# Patient Record
Sex: MALE | Race: BLACK OR AFRICAN AMERICAN | Employment: FULL TIME | URBAN - METROPOLITAN AREA
[De-identification: names, ages, dates, MRNs, and addresses within clinical notes are randomized per-mention and may not be internally consistent; named-entity substitution may affect disease eponyms.]

---

## 2018-07-24 ENCOUNTER — OFFICE VISIT (OUTPATIENT)
Dept: ORTHOPEDIC SURGERY | Age: 28
End: 2018-07-24

## 2018-07-24 VITALS
BODY MASS INDEX: 39.17 KG/M2 | DIASTOLIC BLOOD PRESSURE: 81 MMHG | WEIGHT: 315 LBS | SYSTOLIC BLOOD PRESSURE: 131 MMHG | HEIGHT: 75 IN

## 2018-07-24 PROCEDURE — L4387 NON-PNEUM WALK BOOT PRE OTS: HCPCS | Performed by: INTERNAL MEDICINE

## 2018-07-24 PROCEDURE — 99203 OFFICE O/P NEW LOW 30 MIN: CPT | Performed by: INTERNAL MEDICINE

## 2018-07-24 NOTE — PROGRESS NOTES
test negative      Skin: There are no rashes, ulcerations or lesions. Gait: Antalgic      Reflex intact lower     Additional Comments:        Additional Examinations:           Left Lower Extremity: Examination of the left lower extremity does not show any tenderness, deformity or injury. Range of motion is unremarkable. There is no gross instability. There are no rashes, ulcerations or lesions. Strength and tone are normal.     Neurolgic -Light touch sensation and manual muscle testing normal L2-S1. No fasiculations. Pattella tendon and Achilles tendon reflexes +2 bilaterally. Seated SLR negative        Office Imaging Results/Procedures PerformedToday:             Office Procedures:     Orders Placed This Encounter   Procedures    Breg J-Walker Boot     Patient was prescribed a Breg J-Walker [employer]. The right  will require stabilization / immobilization from this semi-rigid / rigid orthosis to improve their function. The orthosis will assist in protecting the affected area, provide functional support and facilitate healing. Patient was instructed to progress ambulation weight bearing as tolerated in the device. The patient was educated and fit by a healthcare professional with expert knowledge and specialization in brace application while under the direct supervision of the physician. Verbal and written instructions for the use of and application of this item were provided. They were instructed to contact the office immediately should the brace result in increased pain, decreased sensation, increased swelling or worsening of the condition. EXAMINATION:   2 XRAY VIEWS OF THE RIGHT ANKLE       7/23/2018 7:44 am       COMPARISON:   None.       HISTORY:   ORDERING PHYSICIAN PROVIDED HISTORY: pain mostly medial mall   TECHNOLOGIST PROVIDED HISTORY:   Technologist Provided Reason for Exam: pt c/o right ankle injury.  pt states   he hurt his ankle about 3-4weeks ago, tried to rest it but pain note was dictated with voice recognition software. Though review and correction are routine, we apologize for any errors. \"

## 2018-07-24 NOTE — LETTER
[facility]  [address] [address]  Phone: [phone]  Fax: [phone]    Lupe Webster MD        July 24, 2018     Patient: Chio Riggs   YOB: 1990   Date of Visit: 7/24/2018       To Whom It May Concern: It is my medical opinion that Sabino Pacheco may return to light duty immediately with the following restrictions: :   Brace boot immobilization with all walking activity and use of crutches as needed for symptom control  No operating of foot pedals or control devices with right lower extremity  These restrictions will be in effect for the next 3 weeks    If you have any questions or concerns, please don't hesitate to call.     Sincerely,    MD Lupe Little MD

## 2018-07-25 ENCOUNTER — TELEPHONE (OUTPATIENT)
Dept: ORTHOPEDIC SURGERY | Age: 28
End: 2018-07-25

## 2018-08-08 ENCOUNTER — OFFICE VISIT (OUTPATIENT)
Dept: ORTHOPEDIC SURGERY | Age: 28
End: 2018-08-08

## 2018-08-08 PROCEDURE — L1902 AFO ANKLE GAUNTLET PRE OTS: HCPCS | Performed by: INTERNAL MEDICINE

## 2018-08-08 PROCEDURE — 99213 OFFICE O/P EST LOW 20 MIN: CPT | Performed by: INTERNAL MEDICINE

## 2018-08-08 NOTE — PROGRESS NOTES
Chief Complaint:   Chief Complaint   Patient presents with    Ankle Pain     f/u R ankle sprain          History of Present Illness:       Patient is a 25 y.o. male returns follow up for the above complaint. The patient was last seen approximately 2 weeksago. The symptoms are improving since the last visit. The patient has had no further testing for the problem. He was unable to return to work with restrictions    Overall increasing more functional and having less pain no progress with weightbearing in the office majority of his pain localizes to the medial aspect of the ankle    No mechanical symptoms or subjective instability     Past Medical History:        Past Medical History:   Diagnosis Date    Second degree atrioventricular block         Present Medications:         Current Outpatient Prescriptions   Medication Sig Dispense Refill    naproxen (NAPROSYN) 500 MG tablet Take 1 tablet by mouth 2 times daily as needed for Pain 60 tablet 0     No current facility-administered medications for this visit. Allergies:      No Known Allergies        Review of Systems:    Pertinent items are noted in HPI    . Vital Signs: There were no vitals filed for this visit. General Exam:     Constitutional: Patient is adequately groomed with no evidence of malnutrition    Physical Exam: right ankle      Primary Exam:    Inspection:  Mild asymmetric soft tissue swelling      Palpation:  Minimal over the lateral ligamentous complex      Range of Motion:  Globally increased with minimal discomfort      Strength:  Near normal low-grade discomfort      Special Tests:  Anterior drawer positive talar tilt stable, single heel rise mild weakness and low-grade discomfort      Skin: There are no rashes, ulcerations or lesions.       Gait: Mildly antalgic      Neurovascular - non focal and intact       Additional Comments:        Additional Examinations:                   Office Imaging Results/Procedures

## 2018-08-08 NOTE — LETTER
[facility]  [address] [address]  Phone: [phone]  Fax: [phone]    Jerrica Beach MD        August 8, 2018     Patient: Abi Burton   YOB: 1990   Date of Visit: 8/8/2018       To Whom It May Concern: It is my medical opinion that Adebayo Sharp should remain out of work until 8/23/18. If you have any questions or concerns, please don't hesitate to call.     Sincerely,      Ramona Hernandez MD.    Jerrica Beach MD

## 2018-08-09 ENCOUNTER — TELEPHONE (OUTPATIENT)
Dept: ORTHOPEDIC SURGERY | Age: 28
End: 2018-08-09

## 2018-08-09 NOTE — TELEPHONE ENCOUNTER
8/8/18  DME   - NO COVERAGE FOUND - SELF  PAY PER [id number] FACESHEET DATED 7/23/18 -  NDS

## 2018-08-22 ENCOUNTER — OFFICE VISIT (OUTPATIENT)
Dept: ORTHOPEDIC SURGERY | Age: 28
End: 2018-08-22

## 2018-08-22 VITALS — WEIGHT: 315 LBS | HEIGHT: 75 IN | BODY MASS INDEX: 39.17 KG/M2

## 2018-08-22 PROCEDURE — 99213 OFFICE O/P EST LOW 20 MIN: CPT | Performed by: INTERNAL MEDICINE

## 2018-08-22 NOTE — PROGRESS NOTES
stable single heel rise with good strength      Skin: There are no rashes, ulcerations or lesions. Gait: nonantalgic    Neurovascular - non focal and intact       Additional Comments:        Additional Examinations:                  Office Imaging Results/Procedures PerformedToday:           Office Procedures:   No orders of the defined types were placed in this encounter. Other Outside Imaging and Testing Personally Reviewed:       none          Assessment   Impression: . Encounter Diagnosis   Name Primary?  Grade 2 ankle sprain, right, initial encounter Yes              Plan:       Allow him to return to full duty as of Monday, August 27, 2018  Continue functional ankle bracing for the next 3 weeks outside the home with recreational and work activities and with sports participation thereafter for at least another month  We will provide him with home exercises along with resistive bands as he has monetary constraints with respect to attending physical therapy  Limited ultrasound evaluation of the ankle/medial ankle and/or MRI when necessary if his symptoms of medial ankle pain persist show no improvement or worsen    Medial ankle pain may well relate to bone contusion related to the lateral ankle sprain. Orders:      No orders of the defined types were placed in this encounter. Kirk Spencer MD.      Disclaimer: \"This note was dictated with voice recognition software. Though review and correction are routine, we apologize for any errors. \"

## 2018-09-01 ENCOUNTER — HOSPITAL ENCOUNTER (OUTPATIENT)
Dept: PHYSICAL THERAPY | Age: 28
Discharge: HOME | End: 2018-09-01
Attending: INTERNAL MEDICINE | Admitting: INTERNAL MEDICINE

## 2019-03-12 ENCOUNTER — APPOINTMENT (OUTPATIENT)
Dept: GENERAL RADIOLOGY | Age: 29
End: 2019-03-12

## 2019-03-12 ENCOUNTER — HOSPITAL ENCOUNTER (OUTPATIENT)
Age: 29
Setting detail: OBSERVATION
Discharge: HOME | End: 2019-03-12
Attending: EMERGENCY MEDICINE | Admitting: INTERNAL MEDICINE

## 2019-03-12 ENCOUNTER — APPOINTMENT (OUTPATIENT)
Dept: CT IMAGING | Age: 29
End: 2019-03-12

## 2019-03-12 VITALS
SYSTOLIC BLOOD PRESSURE: 134 MMHG | WEIGHT: 315 LBS | TEMPERATURE: 98.4 F | HEART RATE: 68 BPM | OXYGEN SATURATION: 94 % | BODY MASS INDEX: 40.87 KG/M2 | RESPIRATION RATE: 19 BRPM | DIASTOLIC BLOOD PRESSURE: 89 MMHG

## 2019-03-12 DIAGNOSIS — R00.1 BRADYCARDIA: ICD-10-CM

## 2019-03-12 DIAGNOSIS — I44.1 AV BLOCK, MOBITZ 1: ICD-10-CM

## 2019-03-12 DIAGNOSIS — R00.1 BRADYCARDIA: Primary | ICD-10-CM

## 2019-03-12 DIAGNOSIS — R07.9 CHEST PAIN, UNSPECIFIED TYPE: Primary | ICD-10-CM

## 2019-03-12 PROBLEM — I49.9 ARRHYTHMIA: Status: ACTIVE | Noted: 2019-03-12

## 2019-03-12 PROBLEM — E66.01 MORBID OBESITY WITH BMI OF 40.0-44.9, ADULT (HCC): Status: ACTIVE | Noted: 2019-03-12

## 2019-03-12 PROBLEM — F41.9 ANXIETY: Status: ACTIVE | Noted: 2019-03-12

## 2019-03-12 PROBLEM — F12.10 CANNABIS ABUSE: Status: ACTIVE | Noted: 2019-03-12

## 2019-03-12 LAB
ANION GAP SERPL CALCULATED.3IONS-SCNC: 10 MMOL/L (ref 3–16)
BASOPHILS ABSOLUTE: 0 K/UL (ref 0–0.2)
BASOPHILS RELATIVE PERCENT: 1 %
BUN BLDV-MCNC: 11 MG/DL (ref 7–20)
CALCIUM SERPL-MCNC: 8.5 MG/DL (ref 8.3–10.6)
CHLORIDE BLD-SCNC: 108 MMOL/L (ref 99–110)
CO2: 23 MMOL/L (ref 21–32)
CREAT SERPL-MCNC: 1 MG/DL (ref 0.9–1.3)
EKG ATRIAL RATE: 63 BPM
EKG DIAGNOSIS: NORMAL
EKG P AXIS: 49 DEGREES
EKG Q-T INTERVAL: 402 MS
EKG QRS DURATION: 86 MS
EKG QTC CALCULATION (BAZETT): 377 MS
EKG R AXIS: 39 DEGREES
EKG T AXIS: 29 DEGREES
EKG VENTRICULAR RATE: 53 BPM
EOSINOPHILS ABSOLUTE: 0.1 K/UL (ref 0–0.6)
EOSINOPHILS RELATIVE PERCENT: 1.2 %
GFR AFRICAN AMERICAN: >60
GFR NON-AFRICAN AMERICAN: >60
GLUCOSE BLD-MCNC: 108 MG/DL (ref 70–99)
HCT VFR BLD CALC: 42 % (ref 40.5–52.5)
HEMOGLOBIN: 13.9 G/DL (ref 13.5–17.5)
LEFT VENTRICULAR EJECTION FRACTION HIGH VALUE: 60 %
LEFT VENTRICULAR EJECTION FRACTION MODE: NORMAL
LV EF: 50 %
LV EF: 55 %
LVEF MODALITY: NORMAL
LYMPHOCYTES ABSOLUTE: 2.3 K/UL (ref 1–5.1)
LYMPHOCYTES RELATIVE PERCENT: 48.9 %
MCH RBC QN AUTO: 32.5 PG (ref 26–34)
MCHC RBC AUTO-ENTMCNC: 33.2 G/DL (ref 31–36)
MCV RBC AUTO: 97.9 FL (ref 80–100)
MONOCYTES ABSOLUTE: 0.4 K/UL (ref 0–1.3)
MONOCYTES RELATIVE PERCENT: 8 %
NEUTROPHILS ABSOLUTE: 1.9 K/UL (ref 1.7–7.7)
NEUTROPHILS RELATIVE PERCENT: 40.9 %
PDW BLD-RTO: 12.9 % (ref 12.4–15.4)
PLATELET # BLD: 226 K/UL (ref 135–450)
PMV BLD AUTO: 7.1 FL (ref 5–10.5)
POTASSIUM REFLEX MAGNESIUM: 4.2 MMOL/L (ref 3.5–5.1)
PRO-BNP: 110 PG/ML (ref 0–124)
RBC # BLD: 4.29 M/UL (ref 4.2–5.9)
SODIUM BLD-SCNC: 141 MMOL/L (ref 136–145)
TROPONIN: <0.01 NG/ML
WBC # BLD: 4.7 K/UL (ref 4–11)

## 2019-03-12 PROCEDURE — 93320 DOPPLER ECHO COMPLETE: CPT

## 2019-03-12 PROCEDURE — G0378 HOSPITAL OBSERVATION PER HR: HCPCS

## 2019-03-12 PROCEDURE — 93010 ELECTROCARDIOGRAM REPORT: CPT | Performed by: INTERNAL MEDICINE

## 2019-03-12 PROCEDURE — 93005 ELECTROCARDIOGRAM TRACING: CPT | Performed by: EMERGENCY MEDICINE

## 2019-03-12 PROCEDURE — 83880 ASSAY OF NATRIURETIC PEPTIDE: CPT

## 2019-03-12 PROCEDURE — 84484 ASSAY OF TROPONIN QUANT: CPT

## 2019-03-12 PROCEDURE — 6360000002 HC RX W HCPCS: Performed by: INTERNAL MEDICINE

## 2019-03-12 PROCEDURE — 71046 X-RAY EXAM CHEST 2 VIEWS: CPT

## 2019-03-12 PROCEDURE — 6360000004 HC RX CONTRAST MEDICATION: Performed by: EMERGENCY MEDICINE

## 2019-03-12 PROCEDURE — 96374 THER/PROPH/DIAG INJ IV PUSH: CPT

## 2019-03-12 PROCEDURE — 99254 IP/OBS CNSLTJ NEW/EST MOD 60: CPT | Performed by: INTERNAL MEDICINE

## 2019-03-12 PROCEDURE — 99285 EMERGENCY DEPT VISIT HI MDM: CPT

## 2019-03-12 PROCEDURE — 85025 COMPLETE CBC W/AUTO DIFF WBC: CPT

## 2019-03-12 PROCEDURE — 80048 BASIC METABOLIC PNL TOTAL CA: CPT

## 2019-03-12 PROCEDURE — 93351 STRESS TTE COMPLETE: CPT

## 2019-03-12 PROCEDURE — 71260 CT THORAX DX C+: CPT

## 2019-03-12 RX ORDER — MORPHINE SULFATE 2 MG/ML
2 INJECTION, SOLUTION INTRAMUSCULAR; INTRAVENOUS EVERY 4 HOURS PRN
Status: DISCONTINUED | OUTPATIENT
Start: 2019-03-12 | End: 2019-03-12 | Stop reason: HOSPADM

## 2019-03-12 RX ORDER — PANTOPRAZOLE SODIUM 40 MG/10ML
40 INJECTION, POWDER, LYOPHILIZED, FOR SOLUTION INTRAVENOUS DAILY
Status: DISCONTINUED | OUTPATIENT
Start: 2019-03-12 | End: 2019-03-12 | Stop reason: HOSPADM

## 2019-03-12 RX ORDER — ONDANSETRON 2 MG/ML
4 INJECTION INTRAMUSCULAR; INTRAVENOUS EVERY 6 HOURS PRN
Status: DISCONTINUED | OUTPATIENT
Start: 2019-03-12 | End: 2019-03-12 | Stop reason: HOSPADM

## 2019-03-12 RX ORDER — SODIUM CHLORIDE 0.9 % (FLUSH) 0.9 %
10 SYRINGE (ML) INJECTION EVERY 12 HOURS SCHEDULED
Status: DISCONTINUED | OUTPATIENT
Start: 2019-03-12 | End: 2019-03-12 | Stop reason: HOSPADM

## 2019-03-12 RX ORDER — KETOROLAC TROMETHAMINE 30 MG/ML
30 INJECTION, SOLUTION INTRAMUSCULAR; INTRAVENOUS EVERY 6 HOURS
Status: DISCONTINUED | OUTPATIENT
Start: 2019-03-12 | End: 2019-03-12 | Stop reason: HOSPADM

## 2019-03-12 RX ORDER — ASPIRIN 81 MG/1
324 TABLET, CHEWABLE ORAL ONCE
Status: DISCONTINUED | OUTPATIENT
Start: 2019-03-12 | End: 2019-03-12 | Stop reason: HOSPADM

## 2019-03-12 RX ORDER — SODIUM CHLORIDE 9 MG/ML
INJECTION, SOLUTION INTRAVENOUS CONTINUOUS
Status: DISCONTINUED | OUTPATIENT
Start: 2019-03-12 | End: 2019-03-12 | Stop reason: HOSPADM

## 2019-03-12 RX ORDER — OMEPRAZOLE 20 MG/1
20 CAPSULE, DELAYED RELEASE ORAL
Qty: 30 CAPSULE | Refills: 0 | Status: SHIPPED | OUTPATIENT
Start: 2019-03-12

## 2019-03-12 RX ORDER — SODIUM CHLORIDE 0.9 % (FLUSH) 0.9 %
10 SYRINGE (ML) INJECTION PRN
Status: DISCONTINUED | OUTPATIENT
Start: 2019-03-12 | End: 2019-03-12 | Stop reason: HOSPADM

## 2019-03-12 RX ORDER — ACETAMINOPHEN 325 MG/1
650 TABLET ORAL EVERY 4 HOURS PRN
Status: DISCONTINUED | OUTPATIENT
Start: 2019-03-12 | End: 2019-03-12 | Stop reason: HOSPADM

## 2019-03-12 RX ORDER — LORAZEPAM 2 MG/ML
0.5 INJECTION INTRAMUSCULAR EVERY 6 HOURS PRN
Status: DISCONTINUED | OUTPATIENT
Start: 2019-03-12 | End: 2019-03-12 | Stop reason: HOSPADM

## 2019-03-12 RX ADMIN — KETOROLAC TROMETHAMINE 30 MG: 30 INJECTION, SOLUTION INTRAMUSCULAR at 17:02

## 2019-03-12 RX ADMIN — IOPAMIDOL 75 ML: 755 INJECTION, SOLUTION INTRAVENOUS at 11:12

## 2019-03-12 RX ADMIN — KETOROLAC TROMETHAMINE 30 MG: 30 INJECTION, SOLUTION INTRAMUSCULAR at 13:07

## 2019-03-12 ASSESSMENT — ENCOUNTER SYMPTOMS
VOMITING: 0
ABDOMINAL PAIN: 0
DIARRHEA: 0
CONSTIPATION: 0
RHINORRHEA: 0
SHORTNESS OF BREATH: 0
SORE THROAT: 0
NAUSEA: 0
BLOOD IN STOOL: 0

## 2019-03-12 ASSESSMENT — PAIN DESCRIPTION - PAIN TYPE: TYPE: ACUTE PAIN

## 2019-03-12 ASSESSMENT — PAIN SCALES - GENERAL
PAINLEVEL_OUTOF10: 3
PAINLEVEL_OUTOF10: 6
PAINLEVEL_OUTOF10: 3

## 2019-03-12 ASSESSMENT — PAIN DESCRIPTION - LOCATION: LOCATION: CHEST

## 2019-04-01 NOTE — PROGRESS NOTES
[address]   Electrophysiology Follow Up   Date: 4/2/2019     CC:2nd degree AV block  HPI: Francisco Mary is a 30 y.o. PMH of morbid obesity, and type 1 second degree AV block for years. Patient has long history of Prolonged TX in a young patient with episodes of wenckebach, Mobitz type 1 in Holter monitoring. He was seen many years ago. GXT showed improvement of AV conduction with exercise and he achieved target heart rates. He was seen recently in the ED by Robert Peña when he presented with atypical chest pain. Stress echo was normal and able to increase his heart rate to 170 during exercise with no exertional AV block. He exercises without difficulty and remains active playing basketball. His job is sedentary. No syncope. Past Medical History:   Diagnosis Date    Second degree atrioventricular block         Past Surgical History:   Procedure Laterality Date    HERNIA REPAIR         Allergies   Allergen Reactions    Aspirin        Social History:   reports that he has quit smoking. His smoking use included cigars. He smoked 1.00 pack per day. He has never used smokeless tobacco. He reports that he drinks alcohol. He reports that he has current or past drug history. Drug: Marijuana. Family History:  family history includes Arrhythmia in his brother. Review of System:  All other systems reviewed except for that noted above.  Pertinent negatives and positives are:      General: negative for fever, chills   Ophthalmic ROS: negative for - eye pain or loss of vision  ENT ROS: negative for - headaches, sore throat   Respiratory: negative for - cough, sputum  Cardiovascular: Reviewed in HPI  Gastrointestinal: negative for - abdominal pain, diarrhea, N/V  Hematology: negative for - bleeding, blood clots, bruising or jaundice  Genito-Urinary:  negative for - Dysuria or incontinence  Musculoskeletal: negative for - Joint swelling, muscle pain  Neurological: negative for - confusion, dizziness, headaches   Psychiatric: No anxiety, no depression. Dermatological: negative for - rash    Physical Examination:  Vitals:    19 1149   BP: 101/71   Pulse: 86   Resp: 14      Wt Readings from Last 3 Encounters:   19 (!) 326 lb (147.9 kg)   19 (!) 327 lb (148.3 kg)   18 (!) 330 lb (149.7 kg)       · Constitutional: Oriented. No distress. · Head: Normocephalic and atraumatic. · Mouth/Throat: Oropharynx is clear and moist.   · Eyes: Conjunctivae normal. EOM are normal.   · Neck: Neck supple. No rigidity. No JVD present. · Cardiovascular: Normal rate, regular rhythm, S1&S2. · Pulmonary/Chest: Bilateral respiratory sounds. No wheezes, No rhonchi. · Abdominal: Soft. Bowel sounds present. No distension, No tenderness. · Musculoskeletal: No tenderness. No edema    · Lymphadenopathy: Has no cervical adenopathy. · Neurological: Alert and oriented. Cranial nerve appears intact, No Gross deficit   · Skin: Skin is warm and dry. No rash noted. · Psychiatric: Has a normal behavior       Labs, diagnostic and imaging results reviewed. Reviewed. Lab Results   Component Value Date    CREATININE 1.0 2019    CREATININE 1.0 2015    AST 29 2015    ALT 31 2015       EC19   Sinus Roberto with 2nd degree av block type I   QTc 391      Stress Echo: 3/12/19   Summary   Normal stress echocardiogram study.     Medication:  Current Outpatient Medications   Medication Sig Dispense Refill    omeprazole (PRILOSEC) 20 MG delayed release capsule Take 1 capsule by mouth every morning (before breakfast) 30 capsule 0     No current facility-administered medications for this visit. Assessment and plan:     2nd Degree Av block type I    - Patient has long history of second-degree AV block type I. He is asymptomatic with it. He has had GXT in the past with improvement of AV block which is reassuring and had no high grade AV block with activity.      Holter monitoring reviewed and showed second-degree AV block type I, asymptomatic. No treatment is needed. - Atypical chest pain:    Chest pressures was atypical.  It has been resolved. Stress echo normal with no evidence of ischemia. Patient has history of costochondritis in the past.  Other causes of chest pain should be evaluated. He is morbidly obese and at risk of having obstructive sleep apnea. Recommend sleep study. - ? SAM: sleep study referral.     - Morbid obesity: Body mass index is 40.75 kg/m². Weight loss recommended. Thank you for allowing me to participate in the care of Pradeep Davalos. Further evaluation will be based upon the patient's clinical course and testing results. All questions and concerns were addressed to the patient/family. Alternatives to my treatment were discussed. I have discussed the above stated plan and the patient verbalized understanding and agreed with the plan. NOTE: This report was transcribed using voice recognition software. Every effort was made to ensure accuracy, however, inadvertent computerized transcription errors may be present. Francisco Ribeiro MD, MPH  [facility], [place]   Office: [phone]     Scribe attestation: This note was scribed in the presence of Francisco Ribeiro MD by Roberto Owusu RN  Physician Attestation: I, Dr. Francisco Ribeiro, confirm that the scribe's documentation has been prepared under my direction and personally reviewed by me in its entirety. I also confirm that the note above accurately reflects all work, treatment, procedures, and medical decision making performed by me.

## 2019-04-02 ENCOUNTER — OFFICE VISIT (OUTPATIENT)
Dept: CARDIOLOGY CLINIC | Age: 29
End: 2019-04-02

## 2019-04-02 VITALS
HEART RATE: 86 BPM | HEIGHT: 75 IN | RESPIRATION RATE: 14 BRPM | WEIGHT: 315 LBS | DIASTOLIC BLOOD PRESSURE: 71 MMHG | BODY MASS INDEX: 39.17 KG/M2 | SYSTOLIC BLOOD PRESSURE: 101 MMHG

## 2019-04-02 DIAGNOSIS — I44.1 AV BLOCK, 2ND DEGREE: ICD-10-CM

## 2019-04-02 DIAGNOSIS — R00.1 BRADYCARDIA: Primary | ICD-10-CM

## 2019-04-02 PROCEDURE — 99214 OFFICE O/P EST MOD 30 MIN: CPT | Performed by: INTERNAL MEDICINE

## 2019-04-02 PROCEDURE — 93000 ELECTROCARDIOGRAM COMPLETE: CPT | Performed by: INTERNAL MEDICINE

## 2019-04-15 ENCOUNTER — OFFICE VISIT (OUTPATIENT)
Dept: PULMONOLOGY | Age: 29
End: 2019-04-15

## 2019-04-15 VITALS
DIASTOLIC BLOOD PRESSURE: 80 MMHG | SYSTOLIC BLOOD PRESSURE: 137 MMHG | HEART RATE: 62 BPM | OXYGEN SATURATION: 98 % | HEIGHT: 75 IN | WEIGHT: 315 LBS | BODY MASS INDEX: 39.17 KG/M2

## 2019-04-15 DIAGNOSIS — K21.9 GERD WITHOUT ESOPHAGITIS: Chronic | ICD-10-CM

## 2019-04-15 DIAGNOSIS — E66.01 MORBID OBESITY, UNSPECIFIED OBESITY TYPE (HCC): Chronic | ICD-10-CM

## 2019-04-15 DIAGNOSIS — R06.83 SNORING: ICD-10-CM

## 2019-04-15 DIAGNOSIS — G47.10 HYPERSOMNIA: Primary | ICD-10-CM

## 2019-04-15 DIAGNOSIS — R00.1 BRADYCARDIA: Chronic | ICD-10-CM

## 2019-04-15 PROCEDURE — 99203 OFFICE O/P NEW LOW 30 MIN: CPT | Performed by: INTERNAL MEDICINE

## 2019-04-15 ASSESSMENT — ENCOUNTER SYMPTOMS
APNEA: 1
RHINORRHEA: 0
CHEST TIGHTNESS: 0
NAUSEA: 0
ALLERGIC/IMMUNOLOGIC NEGATIVE: 1
SHORTNESS OF BREATH: 1
CHOKING: 0
PHOTOPHOBIA: 0
ABDOMINAL PAIN: 0
VOMITING: 0
EYE PAIN: 0
ABDOMINAL DISTENTION: 0

## 2019-04-15 ASSESSMENT — SLEEP AND FATIGUE QUESTIONNAIRES
HOW LIKELY ARE YOU TO NOD OFF OR FALL ASLEEP WHILE LYING DOWN TO REST IN THE AFTERNOON WHEN CIRCUMSTANCES PERMIT: 1
HOW LIKELY ARE YOU TO NOD OFF OR FALL ASLEEP WHEN YOU ARE A PASSENGER IN A CAR FOR AN HOUR WITHOUT A BREAK: 3
ESS TOTAL SCORE: 11
HOW LIKELY ARE YOU TO NOD OFF OR FALL ASLEEP WHILE SITTING INACTIVE IN A PUBLIC PLACE: 1
HOW LIKELY ARE YOU TO NOD OFF OR FALL ASLEEP WHILE SITTING QUIETLY AFTER LUNCH WITHOUT ALCOHOL: 3
HOW LIKELY ARE YOU TO NOD OFF OR FALL ASLEEP WHILE WATCHING TV: 2
NECK CIRCUMFERENCE (INCHES): 18
HOW LIKELY ARE YOU TO NOD OFF OR FALL ASLEEP IN A CAR, WHILE STOPPED FOR A FEW MINUTES IN TRAFFIC: 0
HOW LIKELY ARE YOU TO NOD OFF OR FALL ASLEEP WHILE SITTING AND READING: 1
HOW LIKELY ARE YOU TO NOD OFF OR FALL ASLEEP WHILE SITTING AND TALKING TO SOMEONE: 0

## 2019-04-15 NOTE — LETTER
[facility]  [address] [facility] [address]  Phone: [phone]  Fax: [phone]      April 15, 2019       Patient: Linsey Nam   MR Number: [id number]   YOB: 1990   Date of Visit: 4/15/2019     Thank you for allowing me to participate in the care of Pool Heath. Here is my assessment and plan. Also attached is a copy of his consult note:    ASSESSMENT:  Visit Diagnoses and Associated Orders     Hypersomnia   (New Problem)  -  Primary    needs work-up    Home Sleep Study (HST) [70390 Custom]   - Future Order         Snoring   (New Problem)      needs work-up    Home Sleep Study (HST) [89510 Custom]   - Future Order         Bradycardia   (Stable)      follow with Cardiology         GERD without esophagitis   (Stable)           Morbid obesity, unspecified obesity type ([facility] [address]   (Stable)                 Plan:     Differential diagnosis includes but not limited to: SAM, PLMD's, narcolepsy, parasomnias. Reviewed SAM (highest likelihood Dx): pathophysiology, diagnosis, complications and treatment. Instructed him not to drive if drowsy. Continue medications per her PCP and other physicians. Limit caffeine use after 3pm. Standard of care is to do in-lab PSG but insurance is mandating an inferior HST. 1 wk follow up after study to review his results. The chronic medical conditions listed are directly related to the primary diagnosis listed above. The management of the primary diagnosis affects the secondary diagnosis and vice versa. Follow with Cards for bradycardia. Continue meds for: GERD. Pt would medically benefit from wt loss for SAM (diet, exercise, surgical). Orders Placed This Encounter   Procedures    Home Sleep Study (HST)         If you have questions or concerns, please do not hesitate to call me. I look forward to following [facility] along with you.     Sincerely,      Liu Mclaen MD    CC providers:  Donny Ayala MD [address] [place] [address].  [address] \[facility]\""

## 2019-04-15 NOTE — PROGRESS NOTES
complains of: snoring, witnessed apneas, excessive daytime sleepiness , non-restorative sleep, napping, drowsiness while driving (on long drives), short term memory issues and tossing and turning at night. He denies: cataplexy and hypnagogic hallucinations. Symptoms began 8 years ago, gradually worsening since that time. Bradycardia, gastroesophageal reflux disease, and obesity: stable on meds and followed by pt's PCP and other physicians. Previous evaluation and treatment has included- attempted polysomngraphy over 4 year ago, panicked and left AMA mid way through    DOT/CDL - No  FAA/'s license -No    Previous Report(s) Reviewed: historical medical records, office notes, andreferral letter(s). Pertinent data has been documented. [place] - Total score: 11    Caffeine Intake - None.     Social History     Socioeconomic History    Marital status: Single     Spouse name: Not on file    Number of children: Not on file    Years of education: Not on file    Highest education level: Not on file   Occupational History    Not on file   Social Needs    Financial resource strain: Not on file    Food insecurity:     Worry: Not on file     Inability: Not on file    Transportation needs:     Medical: Not on file     Non-medical: Not on file   Tobacco Use    Smoking status: Former Smoker     Packs/day: 1.00     Types: Cigars    Smokeless tobacco: Never Used    Tobacco comment: two black and mild(cigars) a day   Substance and Sexual Activity    Alcohol use: Yes     Comment: socially    Drug use: Yes     Types: Marijuana    Sexual activity: Not on file   Lifestyle    Physical activity:     Days per week: Not on file     Minutes per session: Not on file    Stress: Not on file   Relationships    Social connections:     Talks on phone: Not on file     Gets together: Not on file     Attends [employer] service: Not on file     Active member of club or organization: Not on file     Attends meetings of clubs or organizations: Not on file     Relationship status: Not on file    Intimate partner violence:     Fear of current or ex partner: Not on file     Emotionally abused: Not on file     Physically abused: Not on file     Forced sexual activity: Not on file   Other Topics Concern    Not on file   Social History Narrative    Not on file        Current Outpatient Medications   Medication Sig Dispense Refill    omeprazole (PRILOSEC) 20 MG delayed release capsule Take 1 capsule by mouth every morning (before breakfast) 30 capsule 0     No current facility-administered medications for this visit. Allergies as of 04/15/2019 - Review Complete 04/15/2019   Allergen Reaction Noted    Aspirin  09/17/2018       Patient Active Problem List   Diagnosis    Chest pain, musculoskeletal    Bradycardia    Fracture phalanges, hand    BWC - Crushing injury of left middle finger    BWC - Crushing injury of left ring finger    BWC - Closed displaced fracture of distal phalanx of middle finger with routine healing    BWC - Closed displaced fracture of distal phalanx of ring finger with routine healing    Chest pain    Morbid obesity with BMI of 40.0-44.9, adult (HCC)    Cannabis abuse    Arrhythmia    Anxiety       Past Medical History:   Diagnosis Date    Second degree atrioventricular block        Past Surgical History:   Procedure Laterality Date    HERNIA REPAIR         Family History   Problem Relation Age of Onset    Arrhythmia Brother         S/P PPM at age 13       Review of Systems   Constitutional: Positive for fatigue. Negative for activity change and appetite change. HENT: Positive for congestion. Negative for nosebleeds, postnasal drip, rhinorrhea and sneezing. Eyes: Negative for photophobia, pain and visual disturbance. Respiratory: Positive for apnea and shortness of breath. Negative for choking and chest tightness. Cardiovascular: Positive for palpitations.    Gastrointestinal: Negative for abdominal distention, abdominal pain, nausea and vomiting. Endocrine: Negative for cold intolerance and heat intolerance. Genitourinary: Negative for difficulty urinating, dysuria, frequency and urgency. Musculoskeletal: Negative. Negative for neck pain and neck stiffness. Skin: Negative. Allergic/Immunologic: Negative. Neurological: Negative for tremors, seizures, syncope and weakness. Hematological: Negative for adenopathy. Does not bruise/bleed easily. Psychiatric/Behavioral: Positive for sleep disturbance. Negative for agitation, behavioral problems and confusion. Objective:     Vitals:  Weight BMI   Wt Readings from Last 3 Encounters:   04/15/19 (!) 327 lb (148.3 kg)   04/02/19 (!) 326 lb (147.9 kg)   03/12/19 (!) 327 lb (148.3 kg)    Body mass index is 40.87 kg/m². BP HR SaO2   BP Readings from Last 3 Encounters:   04/15/19 137/80   04/02/19 101/71   03/12/19 134/89    Pulse Readings from Last 3 Encounters:   04/15/19 62   04/02/19 86   03/12/19 68    SpO2 Readings from Last 3 Encounters:   04/15/19 98%   03/12/19 94%   09/17/18 98%        Physical Exam   Constitutional: He is oriented to person, place, and time. Vital signs are normal. He appears well-developed and well-nourished. Non-toxic appearance. He does not have a sickly appearance. He is not intubated. HENT:   Head: Normocephalic and atraumatic. Not macrocephalic and not microcephalic. Right Ear: External ear normal.   Left Ear: External ear normal.   Nose: Mucosal edema and septal deviation present. Mouth/Throat: Uvula is midline and mucous membranes are normal. Posterior oropharyngeal edema present. No oropharyngeal exudate or tonsillar abscesses. Tonsils:   normal size, normal appearance  Post. Pharynx:   normal mucosa  Neck circumference: 18  Inches     Eyes: Pupils are equal, round, and reactive to light. Conjunctivae, EOM and lids are normal.   Neck: Trachea normal and normal range of motion. Neck supple. No tracheal deviation present. No thyroid mass and no thyromegaly present. Cardiovascular: Normal rate, regular rhythm, S1 normal, S2 normal and normal heart sounds. Pulmonary/Chest: Effort normal. No accessory muscle usage. No apnea, no tachypnea and no bradypnea. He is not intubated. No respiratory distress. He has no decreased breath sounds. He has no wheezes. He has no rhonchi. He has no rales. He exhibits no mass, no tenderness and no deformity. Abdominal: Soft. Bowel sounds are normal. He exhibits no distension. There is no tenderness. Musculoskeletal: He exhibits no edema. Gait - normal  No evidence of cyanosis or clubbing of nails   Lymphadenopathy:        Head (right side): No submental, no submandibular and no tonsillar adenopathy present. Head (left side): No submental, no submandibular and no tonsillar adenopathy present. Neurological: He is alert and oriented to person, place, and time. No cranial nerve deficit. Skin: Skin is warm, dry and intact. No cyanosis. Nails show no clubbing. Psychiatric: He has a normal mood and affect. His speech is normal and behavior is normal. Judgment and thought content normal.   Nursing note and vitals reviewed.       Electronically signed by Shaun Pierre MD on4/15/2019 at 11:19 AM

## 2019-04-26 ENCOUNTER — TELEPHONE (OUTPATIENT)
Dept: CARDIOLOGY CLINIC | Age: 29
End: 2019-04-26

## 2019-05-03 NOTE — TELEPHONE ENCOUNTER
Holter was already reviewed and patient has already been seen by EP for second degree AV block type I

## 2019-11-22 ENCOUNTER — HOSPITAL ENCOUNTER (EMERGENCY)
Age: 29
Discharge: HOME | End: 2019-11-22
Attending: EMERGENCY MEDICINE

## 2019-11-22 VITALS
OXYGEN SATURATION: 96 % | RESPIRATION RATE: 17 BRPM | TEMPERATURE: 98.3 F | DIASTOLIC BLOOD PRESSURE: 75 MMHG | SYSTOLIC BLOOD PRESSURE: 134 MMHG | HEART RATE: 77 BPM

## 2019-11-22 DIAGNOSIS — R42 ORTHOSTATIC DIZZINESS: ICD-10-CM

## 2019-11-22 DIAGNOSIS — R11.2 NAUSEA VOMITING AND DIARRHEA: Primary | ICD-10-CM

## 2019-11-22 DIAGNOSIS — R19.7 NAUSEA VOMITING AND DIARRHEA: Primary | ICD-10-CM

## 2019-11-22 LAB
ALBUMIN SERPL-MCNC: 4.4 G/DL (ref 3.4–5)
ALP BLD-CCNC: 73 U/L (ref 40–129)
ALT SERPL-CCNC: 25 U/L (ref 10–40)
ANION GAP SERPL CALCULATED.3IONS-SCNC: 11 MMOL/L (ref 3–16)
AST SERPL-CCNC: 22 U/L (ref 15–37)
BASOPHILS ABSOLUTE: 0.1 K/UL (ref 0–0.2)
BASOPHILS RELATIVE PERCENT: 0.8 %
BILIRUB SERPL-MCNC: 1.5 MG/DL (ref 0–1)
BILIRUBIN DIRECT: 0.3 MG/DL (ref 0–0.3)
BILIRUBIN URINE: NEGATIVE
BILIRUBIN, INDIRECT: 1.2 MG/DL (ref 0–1)
BLOOD, URINE: NEGATIVE
BUN BLDV-MCNC: 15 MG/DL (ref 7–20)
CALCIUM SERPL-MCNC: 9.4 MG/DL (ref 8.3–10.6)
CHLORIDE BLD-SCNC: 103 MMOL/L (ref 99–110)
CLARITY: CLEAR
CO2: 21 MMOL/L (ref 21–32)
COLOR: YELLOW
CREAT SERPL-MCNC: 0.8 MG/DL (ref 0.9–1.3)
EOSINOPHILS ABSOLUTE: 0 K/UL (ref 0–0.6)
EOSINOPHILS RELATIVE PERCENT: 0.4 %
GFR AFRICAN AMERICAN: >60
GFR NON-AFRICAN AMERICAN: >60
GLUCOSE BLD-MCNC: 115 MG/DL (ref 70–99)
GLUCOSE URINE: NEGATIVE MG/DL
HCT VFR BLD CALC: 43.9 % (ref 40.5–52.5)
HEMOGLOBIN: 14.9 G/DL (ref 13.5–17.5)
KETONES, URINE: 15 MG/DL
LEUKOCYTE ESTERASE, URINE: NEGATIVE
LIPASE: 20 U/L (ref 13–60)
LYMPHOCYTES ABSOLUTE: 0.8 K/UL (ref 1–5.1)
LYMPHOCYTES RELATIVE PERCENT: 12.4 %
MCH RBC QN AUTO: 33 PG (ref 26–34)
MCHC RBC AUTO-ENTMCNC: 33.9 G/DL (ref 31–36)
MCV RBC AUTO: 97.4 FL (ref 80–100)
MICROSCOPIC EXAMINATION: ABNORMAL
MONOCYTES ABSOLUTE: 0.4 K/UL (ref 0–1.3)
MONOCYTES RELATIVE PERCENT: 6.3 %
NEUTROPHILS ABSOLUTE: 5.1 K/UL (ref 1.7–7.7)
NEUTROPHILS RELATIVE PERCENT: 80.1 %
NITRITE, URINE: NEGATIVE
PDW BLD-RTO: 13 % (ref 12.4–15.4)
PH UA: 7.5 (ref 5–8)
PLATELET # BLD: 258 K/UL (ref 135–450)
PMV BLD AUTO: 7.1 FL (ref 5–10.5)
POTASSIUM SERPL-SCNC: 4.4 MMOL/L (ref 3.5–5.1)
PROTEIN UA: NEGATIVE MG/DL
RAPID INFLUENZA  B AGN: NEGATIVE
RAPID INFLUENZA A AGN: NEGATIVE
RBC # BLD: 4.51 M/UL (ref 4.2–5.9)
SODIUM BLD-SCNC: 135 MMOL/L (ref 136–145)
SPECIFIC GRAVITY UA: 1.02 (ref 1–1.03)
TOTAL PROTEIN: 7.5 G/DL (ref 6.4–8.2)
URINE TYPE: ABNORMAL
UROBILINOGEN, URINE: 1 E.U./DL
WBC # BLD: 6.3 K/UL (ref 4–11)

## 2019-11-22 PROCEDURE — 6360000002 HC RX W HCPCS: Performed by: EMERGENCY MEDICINE

## 2019-11-22 PROCEDURE — 96374 THER/PROPH/DIAG INJ IV PUSH: CPT

## 2019-11-22 PROCEDURE — 99283 EMERGENCY DEPT VISIT LOW MDM: CPT

## 2019-11-22 PROCEDURE — 87804 INFLUENZA ASSAY W/OPTIC: CPT

## 2019-11-22 PROCEDURE — 96375 TX/PRO/DX INJ NEW DRUG ADDON: CPT

## 2019-11-22 PROCEDURE — 80048 BASIC METABOLIC PNL TOTAL CA: CPT

## 2019-11-22 PROCEDURE — 96361 HYDRATE IV INFUSION ADD-ON: CPT

## 2019-11-22 PROCEDURE — 81003 URINALYSIS AUTO W/O SCOPE: CPT

## 2019-11-22 PROCEDURE — 83690 ASSAY OF LIPASE: CPT

## 2019-11-22 PROCEDURE — 36415 COLL VENOUS BLD VENIPUNCTURE: CPT

## 2019-11-22 PROCEDURE — 80076 HEPATIC FUNCTION PANEL: CPT

## 2019-11-22 PROCEDURE — 85025 COMPLETE CBC W/AUTO DIFF WBC: CPT

## 2019-11-22 PROCEDURE — 2580000003 HC RX 258: Performed by: EMERGENCY MEDICINE

## 2019-11-22 RX ORDER — 0.9 % SODIUM CHLORIDE 0.9 %
1000 INTRAVENOUS SOLUTION INTRAVENOUS ONCE
Status: COMPLETED | OUTPATIENT
Start: 2019-11-22 | End: 2019-11-22

## 2019-11-22 RX ORDER — ONDANSETRON 2 MG/ML
4 INJECTION INTRAMUSCULAR; INTRAVENOUS ONCE
Status: COMPLETED | OUTPATIENT
Start: 2019-11-22 | End: 2019-11-22

## 2019-11-22 RX ORDER — MORPHINE SULFATE 4 MG/ML
4 INJECTION, SOLUTION INTRAMUSCULAR; INTRAVENOUS ONCE
Status: COMPLETED | OUTPATIENT
Start: 2019-11-22 | End: 2019-11-22

## 2019-11-22 RX ORDER — ONDANSETRON 4 MG/1
4 TABLET, FILM COATED ORAL EVERY 8 HOURS PRN
Qty: 20 TABLET | Refills: 0 | Status: SHIPPED | OUTPATIENT
Start: 2019-11-22 | End: 2019-11-22 | Stop reason: SDUPTHER

## 2019-11-22 RX ORDER — ONDANSETRON 4 MG/1
4 TABLET, FILM COATED ORAL EVERY 8 HOURS PRN
Qty: 20 TABLET | Refills: 0 | Status: SHIPPED | OUTPATIENT
Start: 2019-11-22

## 2019-11-22 RX ADMIN — MORPHINE SULFATE 4 MG: 4 INJECTION INTRAVENOUS at 01:26

## 2019-11-22 RX ADMIN — SODIUM CHLORIDE 1000 ML: 9 INJECTION, SOLUTION INTRAVENOUS at 02:46

## 2019-11-22 RX ADMIN — SODIUM CHLORIDE 1000 ML: 9 INJECTION, SOLUTION INTRAVENOUS at 01:26

## 2019-11-22 RX ADMIN — ONDANSETRON 4 MG: 2 INJECTION INTRAMUSCULAR; INTRAVENOUS at 01:26

## 2019-11-22 ASSESSMENT — PAIN DESCRIPTION - PAIN TYPE: TYPE: ACUTE PAIN

## 2019-11-22 ASSESSMENT — PAIN SCALES - GENERAL: PAINLEVEL_OUTOF10: 8

## 2019-11-22 ASSESSMENT — ENCOUNTER SYMPTOMS
BACK PAIN: 0
DIARRHEA: 1
SHORTNESS OF BREATH: 0
NAUSEA: 1
CHEST TIGHTNESS: 0
VOMITING: 1
ABDOMINAL PAIN: 1

## 2019-11-22 ASSESSMENT — PAIN DESCRIPTION - ORIENTATION: ORIENTATION: RIGHT;MID

## 2019-11-22 ASSESSMENT — PAIN DESCRIPTION - DESCRIPTORS: DESCRIPTORS: ACHING

## 2019-11-22 ASSESSMENT — PAIN DESCRIPTION - LOCATION: LOCATION: ABDOMEN

## 2020-03-11 ENCOUNTER — HOSPITAL ENCOUNTER (EMERGENCY)
Age: 30
Discharge: HOME | End: 2020-03-11
Attending: EMERGENCY MEDICINE

## 2020-03-11 ENCOUNTER — APPOINTMENT (OUTPATIENT)
Dept: GENERAL RADIOLOGY | Age: 30
End: 2020-03-11

## 2020-03-11 VITALS
RESPIRATION RATE: 17 BRPM | SYSTOLIC BLOOD PRESSURE: 127 MMHG | TEMPERATURE: 98.2 F | HEIGHT: 75 IN | WEIGHT: 315 LBS | HEART RATE: 78 BPM | OXYGEN SATURATION: 99 % | BODY MASS INDEX: 39.17 KG/M2 | DIASTOLIC BLOOD PRESSURE: 85 MMHG

## 2020-03-11 PROCEDURE — 99283 EMERGENCY DEPT VISIT LOW MDM: CPT

## 2020-03-11 PROCEDURE — 73080 X-RAY EXAM OF ELBOW: CPT

## 2020-03-11 ASSESSMENT — PAIN DESCRIPTION - ORIENTATION: ORIENTATION: LEFT

## 2020-03-11 ASSESSMENT — PAIN DESCRIPTION - PAIN TYPE: TYPE: ACUTE PAIN

## 2020-03-11 ASSESSMENT — PAIN SCALES - GENERAL: PAINLEVEL_OUTOF10: 6

## 2020-03-11 ASSESSMENT — PAIN DESCRIPTION - LOCATION: LOCATION: ELBOW

## 2020-06-16 ENCOUNTER — TELEPHONE (OUTPATIENT)
Dept: ORTHOPEDIC SURGERY | Age: 30
End: 2020-06-16

## 2020-06-16 ENCOUNTER — OFFICE VISIT (OUTPATIENT)
Dept: ORTHOPEDIC SURGERY | Age: 30
End: 2020-06-16

## 2020-06-16 VITALS — HEIGHT: 75 IN | WEIGHT: 315 LBS | BODY MASS INDEX: 39.17 KG/M2

## 2020-06-16 PROCEDURE — 99203 OFFICE O/P NEW LOW 30 MIN: CPT | Performed by: ORTHOPAEDIC SURGERY

## 2020-06-16 PROCEDURE — L3807 WHFO W/O JOINTS PRE CST: HCPCS | Performed by: ORTHOPAEDIC SURGERY

## 2020-06-16 RX ORDER — IBUPROFEN 200 MG
200 TABLET ORAL EVERY 6 HOURS PRN
COMMUNITY

## 2020-06-16 NOTE — PROGRESS NOTES
encounter. Treatment Plan: I reviewed the images performed 3 days ago demonstrating metacarpal shaft fracture with minimal displacement. Clinically no evidence of malrotation or angulation and a slight extensor lag at the MP joint. I discussed with the patient today options for treatment including both operative and nonoperative intervention. He is now nearly 2 weeks out from his injury and has been using his hand for about a week and a half prior to presenting for x-rays. His x-rays do demonstrate what appears to be acceptable alignment despite him using it and I have therefore less concerned that interval displacement will occur. However I did discuss with the patient the importance of continued to protect his hand and at this point would recommend nonoperative treatment with immobilization. We will provide him today with ulnar gutter brace to be worn at all times. He is to remove only for hygiene and for removing for skin care. I will plan to see him back in approximately 1 week for repeat evaluation and images of his right hand.   He will be one-handed duty at work and we anticipate brace wear for approximately 1 month from now at which point we will likely transition to cintia taping and beginning progressive range of motion for his finger

## 2020-06-23 ENCOUNTER — OFFICE VISIT (OUTPATIENT)
Dept: ORTHOPEDIC SURGERY | Age: 30
End: 2020-06-23

## 2020-06-23 VITALS — HEIGHT: 75 IN | BODY MASS INDEX: 39.17 KG/M2 | WEIGHT: 315 LBS

## 2020-06-23 PROCEDURE — 99213 OFFICE O/P EST LOW 20 MIN: CPT | Performed by: ORTHOPAEDIC SURGERY

## 2020-06-23 NOTE — PROGRESS NOTES
Assessment: 42-year-old male presenting with history of right hand injury after punching a hard object approximately 3 weeks ago  1. Right small finger metacarpal shaft fracture    Treatment Plan: It appears that Mr. Galvan's finger alignment both clinically and radiographically continues to be within acceptable limits. He does have evidence of healing radiographically. I discussed with him today treatment plan and we discussed both operative and nonoperative intervention. At this point based on our discussion and his alignment I think it is appropriate to continue with nonoperative treatment. He will remain on one-handed duty and is to keep his brace in place at all times except for hand hygiene. We will perform a brace adjustment today. I will plan to see him back in 3 weeks for repeat evaluation and imaging and if healing is occurring at that point we will plan to transition from his brace to cintia taping and beginning range of motion formally    No follow-ups on file. History of Present Illness  Shelia Lozano is a 26 y.o. male, right-hand-dominant, works at [employer], presenting with history of right hand injury  Date of injury: Approximately 3 weeks ago  Mechanism of injury: The point patient struck a door frame with his right hand out of anger. He subsequently developed pain and swelling in his right hand and this weekend nearly 2 weeks ago presented to urgent care in a subacute manner. X-rays there were obtained which he brings with him today demonstrating reportedly right small finger metacarpal fracture  The patient has been in a splint over the last several days and has been wearing it until now. He rates his pain on average is 6 out of 10. He has been taking ibuprofen for pain. Interval history: The patient states that he is improving but he still does have some occasional soreness in his hand. He rates his pain routinely as 2 out of 10.   He feels that the brace has been right hand  Impression comminuted fracture right small finger metacarpal shaft with minimal change in alignment approximately 5 degrees of angulation. There does appear to be interval healing and callus formation near the fracture. No additional osseous abnormality      Additional Diagnostic Test Findings:    Office Procedures:  Orders Placed This Encounter   Procedures    XR HAND RIGHT (MIN 3 VIEWS)           Kolby Camargo MD  Orthopaedic Surgeon, Hand & Upper Extremity   [facility]    Contact Information:  Magda Fairbanks: [phone] [id number] Clinical )    This dictation was performed with a verbal recognition program [facility]) and it was checked for errors. It is possible that there are still dictated errors within this office note. If so, please bring any errors to my attention for an addendum. All efforts were made to ensure that this office note is accurate.

## 2020-07-14 ENCOUNTER — OFFICE VISIT (OUTPATIENT)
Dept: ORTHOPEDIC SURGERY | Age: 30
End: 2020-07-14

## 2020-07-14 VITALS — HEIGHT: 75 IN | WEIGHT: 315 LBS | BODY MASS INDEX: 39.17 KG/M2

## 2020-07-14 PROCEDURE — 99213 OFFICE O/P EST LOW 20 MIN: CPT | Performed by: ORTHOPAEDIC SURGERY

## 2020-07-14 NOTE — PROGRESS NOTES
Assessment: 49-year-old male presenting with history of right hand injury after punching a hard object approximately  6 weeks ago  1.  Right small finger metacarpal shaft fracture    Treatment Plan: The patient demonstrates both clinical and radiographic evidence of healing today. We will plan to continue with protection of his hand and begin weaning from his brace over the next 1 week and transitioning to cintia tape for support. Lifting restrictions with no lifting or gripping starting next week no greater than 10 pounds of lifting and gripping. Continue and encourage finger range of motion in the meantime as he begins to wean out of the brace. I will plan to see him back in 3 to 4 weeks for repeat evaluation and imaging at which point if he is progressing and healing as expected we will likely progress his activity at that point    No follow-ups on file. History of Present Illness  Arminda Galvan is a 26 y. o. male, right-hand-dominant, works at [employer] presenting with history of right hand injury  Date of injury: Approximately 6 weeks ago  Mechanism of injury: The point patient struck a door frame with his right hand out of anger. He subsequently developed pain and swelling in his right hand and this weekend nearly 2 weeks ago presented to urgent care in a subacute manner.  X-rays there were obtained which he brings with him today demonstrating reportedly right small finger metacarpal fracture    Interval history: The patient presents today in approximately 6 weeks since his injury. He feels that he is doing much better wearing his brace to most of the time. He does not report any new pain today. No new events or injuries to his right hand since his last visit. Review of Systems  Pertinent items are noted in HPI  Review of systems reviewed from Patient History Form dated on 6/16/20 and available in the patient's chart under the Media tab.           Vital Signs  There were no vitals filed for this visit. Physical Exam  Constitutional:  Patient is well-nourished and demonstrates normal hygiene. Mental Status:  Patient is alert and oriented to person, place and time. Skin:  Intact, no rashes or lesions. Right hand/right upper extremity examination  Inspection: No swelling of the right hand dorsal aspect  No open wounds abrasions or additional skin changes  No obvious discoloration  No evidence of malrotation or angulation at the small finger with small finger and slight extensor lag at the MP joint approximately 5 degrees that is actively and passively correctable     Palpation:   L tenderness focally along the small finger metacarpal with nontender throughout the remainder of the right hand  Nontender at the right wrist     Range of Motion: Near full composite fist and full active extension of the small finger and all other fingers with no scissoring or crossing over       Strength: 5 out of 5 strength EPL FPL thumb abduction  5/5 FDS FDP EDC interossei     Special Tests: Gross sensation intact median ulnar radial nerve without deficit  Brisk capillary refill all fingers  Painless wrist range of motion in all planes    Capillary refill brisk all fingers, symmetric  Gross sensation intact to light touch median/ulnar/radial nerves  Sensation intact to radial/ulnar aspect of fingertip        Radiology:    X-rays obtained and reviewed in office:  Views 3  Location right hand  Impression no interval change in alignment of right small finger metacarpal shaft fracture.   Interval healing of fracture noted throughout with no additional bony abnormalities      Additional Diagnostic Test Findings:    Office Procedures:  Orders Placed This Encounter   Procedures    XR HAND RIGHT (MIN 3 VIEWS)           Ceferino Do MD  Orthopaedic Surgeon, [id number] [facility]    Contact Information:  Maurice Markham: [phone] Clinical )    This dictation was performed with a verbal recognition program ([employer]) and it was checked for errors. It is possible that there are still dictated errors within this office note. If so, please bring any errors to my attention for an addendum. All efforts were made to ensure that this office note is accurate.

## 2020-08-12 ENCOUNTER — OFFICE VISIT (OUTPATIENT)
Dept: ORTHOPEDIC SURGERY | Age: 30
End: 2020-08-12

## 2020-08-12 VITALS — BODY MASS INDEX: 39.17 KG/M2 | WEIGHT: 315 LBS | HEIGHT: 75 IN

## 2020-08-12 PROBLEM — S62.356A CLOSED NONDISPLACED FRACTURE OF SHAFT OF FIFTH METACARPAL BONE OF RIGHT HAND: Status: ACTIVE | Noted: 2020-08-12

## 2020-08-12 PROCEDURE — 99213 OFFICE O/P EST LOW 20 MIN: CPT | Performed by: ORTHOPAEDIC SURGERY

## 2020-08-12 NOTE — PROGRESS NOTES
fingertip        Radiology:    X-rays obtained and reviewed in office:  Views 3  Location right hand  Impression interval healing of comminuted distal metacarpal shaft fracture. No change in alignment compared with prior images. There is bony bridging and callus formation noted  No additional acute osseous abnormalities    Additional Diagnostic Test Findings:    Office Procedures:  Orders Placed This Encounter   Procedures    XR HAND RIGHT (MIN 3 VIEWS)     Standing Status:   Future     Number of Occurrences:   1     Standing Expiration Date:   8/12/2021           Neo Ramirez MD  Orthopaedic Surgeon, [address]    Contact Information:  [facility]: [phone] Clinical )    This dictation was performed with a verbal recognition program [facility]) and it was checked for errors. It is possible that there are still dictated errors within this office note. If so, please bring any errors to my attention for an addendum. All efforts were made to ensure that this office note is accurate.

## 2020-08-12 NOTE — LETTER
[address] [address]  Phone: [phone]  Fax: [phone]    Reyna Dalal MD        August 12, 2020     Patient: Clement Mast   YOB: 1990   Date of Visit: 8/12/2020       To Whom It May Concern: It is my medical opinion that Margaux Bolanos may return to full duty immediately with no restrictions. If you have any questions or concerns, please don't hesitate to call.     Sincerely,        Reyna Dalal MD

## 2021-01-17 ENCOUNTER — HOSPITAL ENCOUNTER (EMERGENCY)
Age: 31
Discharge: HOME | End: 2021-01-17
Attending: EMERGENCY MEDICINE
Payer: OTHER GOVERNMENT

## 2021-01-17 ENCOUNTER — APPOINTMENT (OUTPATIENT)
Dept: GENERAL RADIOLOGY | Age: 31
End: 2021-01-17
Payer: OTHER GOVERNMENT

## 2021-01-17 VITALS
TEMPERATURE: 96.8 F | DIASTOLIC BLOOD PRESSURE: 83 MMHG | BODY MASS INDEX: 39.17 KG/M2 | HEART RATE: 65 BPM | WEIGHT: 315 LBS | OXYGEN SATURATION: 97 % | HEIGHT: 75 IN | RESPIRATION RATE: 16 BRPM | SYSTOLIC BLOOD PRESSURE: 136 MMHG

## 2021-01-17 DIAGNOSIS — R09.81 CONGESTED NOSE: ICD-10-CM

## 2021-01-17 DIAGNOSIS — U07.1 COVID-19: Primary | ICD-10-CM

## 2021-01-17 PROCEDURE — 71045 X-RAY EXAM CHEST 1 VIEW: CPT

## 2021-01-17 PROCEDURE — 99282 EMERGENCY DEPT VISIT SF MDM: CPT

## 2021-01-17 PROCEDURE — 93005 ELECTROCARDIOGRAM TRACING: CPT | Performed by: EMERGENCY MEDICINE

## 2021-01-17 RX ORDER — GUAIFENESIN 600 MG/1
1200 TABLET, EXTENDED RELEASE ORAL 2 TIMES DAILY
Qty: 30 TABLET | Refills: 0 | Status: SHIPPED | OUTPATIENT
Start: 2021-01-17 | End: 2021-02-01

## 2021-01-17 ASSESSMENT — ENCOUNTER SYMPTOMS
DIARRHEA: 0
SHORTNESS OF BREATH: 0
ABDOMINAL PAIN: 0
VOMITING: 0
NAUSEA: 0
COUGH: 0
CHEST TIGHTNESS: 1

## 2021-01-18 ENCOUNTER — CARE COORDINATION (OUTPATIENT)
Dept: CARE COORDINATION | Age: 31
End: 2021-01-18

## 2021-01-18 LAB
EKG ATRIAL RATE: 59 BPM
EKG DIAGNOSIS: NORMAL
EKG P AXIS: 64 DEGREES
EKG P-R INTERVAL: 212 MS
EKG Q-T INTERVAL: 376 MS
EKG QRS DURATION: 88 MS
EKG QTC CALCULATION (BAZETT): 372 MS
EKG R AXIS: 74 DEGREES
EKG T AXIS: 64 DEGREES
EKG VENTRICULAR RATE: 59 BPM

## 2021-01-18 PROCEDURE — 93010 ELECTROCARDIOGRAM REPORT: CPT | Performed by: INTERNAL MEDICINE

## 2021-01-18 NOTE — ED PROVIDER NOTES
[facility]        Pt Name: Sourav Segura  MRN: [id number]  [place] 1990  Date of evaluation: 1/17/2021  Provider: SAUL Goodman CNP  PCP: No primary care provider on file. I have seen and evaluated this patient with my supervising physician Shilpa Pal MD.    [address] [place] [address]       Chief Complaint   Patient presents with    Chest Pain     Patient reports he tested positive for covid 8 days ago, began having chest pressure approx 1 hour ago. HISTORY OF PRESENT ILLNESS   (Location, Timing/Onset, Context/Setting, Quality, Duration, Modifying Factors, Severity, Associated Signs and Symptoms)  Note limiting factors. Sourav Segura is a 26 y.o. male that presents to the emergency department with complaints of \"chest pressure\" that started today. Pt states he tested positive for COVID-19 approx. 8days ago. Pain is worse when attempting to take a deep breath. Pain does not radiate. Pt rates pain as a 1/10 on pain scale. No alleviating factors expressed by patient at this time. Denies any headache, fever, lightheadedness, dizziness, visual disturbances. No neck or back pain. No shortness of breath, cough, or congestion. No abdominal pain, nausea, vomiting, diarrhea, constipation, or dysuria. No rash. Nursing Notes were all reviewed and agreed with or any disagreements were addressed in the HPI. REVIEW OF SYSTEMS    (2-9 systems for level 4, 10 or more for level 5)     Review of Systems   Constitutional: Negative for activity change, chills and fever. Respiratory: Positive for chest tightness. Negative for cough and shortness of breath. Cardiovascular: Negative for chest pain. Gastrointestinal: Negative for abdominal pain, diarrhea, nausea and vomiting. Genitourinary: Negative for dysuria. Neurological: Negative for dizziness.    All other systems reviewed and are negative. Positives and Pertinent negatives as per HPI. Except as noted above in the ROS, all other systems were reviewed and negative. PAST MEDICAL HISTORY     Past Medical History:   Diagnosis Date    Second degree atrioventricular block          SURGICAL HISTORY     Past Surgical History:   Procedure Laterality Date    HERNIA REPAIR           CURRENTMEDICATIONS       Discharge Medication List as of 2021 10:56 PM      CONTINUE these medications which have NOT CHANGED    Details   ibuprofen (ADVIL;MOTRIN) 200 MG tablet Take 200 mg by mouth every 6 hours as needed for PainHistorical Med      ondansetron (ZOFRAN) 4 MG tablet Take 1 tablet by mouth every 8 hours as needed for Nausea, Disp-20 tablet, R-0Print      omeprazole (PRILOSEC) 20 MG delayed release capsule Take 1 capsule by mouth every morning (before breakfast), Disp-30 capsule, R-0Print               ALLERGIES     Aspirin    FAMILYHISTORY       Family History   Problem Relation Age of Onset    Arrhythmia Brother         S/P PPM at age 15          SOCIAL HISTORY       Social History     Tobacco Use    Smoking status: Former Smoker     Packs/day: 1.00     Years: 12.00     Pack years: 12.00     Types: Cigars     Quit date: 2018     Years since quittin.2    Smokeless tobacco: Never Used    Tobacco comment: two black and mild(cigars) a day   Substance Use Topics    Alcohol use: Yes     Comment: socially    Drug use: Yes     Types: Marijuana       SCREENINGS             PHYSICAL EXAM    (up to 7 for level 4, 8 or more for level 5)     ED Triage Vitals [21 2144]   BP Temp Temp Source Pulse Resp SpO2 Height Weight   (!) 130/99 96.8 °F (36 °C) Infrared 65 16 97 % 6' 3\" (1.905 m) (!) 350 lb (158.8 kg)       Physical Exam  Vitals signs and nursing note reviewed. Constitutional:       Appearance: He is well-developed. He is not diaphoretic. HENT:      Head: Normocephalic and atraumatic.       Right Ear: External ear normal. Left Ear: External ear normal.   Eyes:      General:         Right eye: No discharge. Left eye: No discharge. Neck:      Musculoskeletal: Normal range of motion and neck supple. Vascular: No JVD. Cardiovascular:      Rate and Rhythm: Normal rate and regular rhythm. Pulmonary:      Effort: Pulmonary effort is normal. No respiratory distress. Breath sounds: Normal breath sounds. Musculoskeletal: Normal range of motion. Skin:     General: Skin is warm and dry. Coloration: Skin is not pale. Neurological:      Mental Status: He is alert and oriented to person, place, and time. Psychiatric:         Behavior: Behavior normal.         DIAGNOSTIC RESULTS   LABS:    Labs Reviewed - No data to display    All other labs were within normal range or not returned as of this dictation. EKG: All EKG's are interpreted by the Emergency Department Physician in the absence of a cardiologist.  Please see their note for interpretation of EKG. RADIOLOGY:   Non-plain film images such as CT, Ultrasound and MRI are read by the radiologist. Plain radiographic images are visualized and preliminarily interpreted by the ED Provider with the below findings:        Interpretation per the Radiologist below, if available at the time of this note:    XR CHEST PORTABLE   Final Result   No acute cardiopulmonary abnormality. No results found.         PROCEDURES   Unless otherwise noted below, none     Procedures    CRITICAL CARE TIME   N/A    CONSULTS:  None      EMERGENCY DEPARTMENT COURSE and DIFFERENTIAL DIAGNOSIS/MDM:   Vitals:    Vitals:    01/17/21 2144 01/17/21 2153   BP: (!) 130/99 136/83   Pulse: 65 65   Resp: 16 16   Temp: 96.8 °F (36 °C)    TempSrc: Infrared    SpO2: 97% 97%   Weight: (!) 350 lb (158.8 kg)    Height: 6' 3\" (1.905 m)        Patient was given the following medications:  Medications - No data to display        Briefly, Bria Medina is a 26 y.o. male that presents to the emergency department with complaints of \"chest pressure\" that started today. Pt states he tested positive for COVID-19 approx. 8days ago. Pain is worse when attempting to take a deep breath. Pain does not radiate. Pt rates pain as a 1/10 on pain scale. No alleviating factors expressed by patient at this time. XR CHEST PORTABLE (Final result)  Result time 01/17/21 22:42:01  Final result by Anthony Ronquillo MD (01/17/21 22:42:01)                Impression:    No acute cardiopulmonary abnormality. EKG reviewed by attending physician. Ambulatory pulse ox did remain above 92%. Patient was given reassurance and close outpatient follow-up. Based on clinical presentation, physical exam and diagnostics, the patient likely has a viral illness. I discussed symptomatic treatment, fluids, and rest. Patient is advised to follow-up with their family doctor within 24-48 hours and return to the ER if he does not improve as anticipated over the next several days, develops difficulty breathing, weakness, inability to take liquids, or has other concerns. FINAL IMPRESSION      1. COVID-19    2.  Congested nose          DISPOSITION/PLAN   DISPOSITION Decision To Discharge 01/17/2021 10:54:40 PM      PATIENT REFERREDTO:  [facility]) Pre-Services  [phone]  Schedule an appointment as soon as possible for a visit         DISCHARGE MEDICATIONS:  Discharge Medication List as of 1/17/2021 10:56 PM      START taking these medications    Details   guaiFENesin (MUCINEX) 600 MG extended release tablet Take 2 tablets by mouth 2 times daily for 15 days, Disp-30 tablet, R-0Normal             DISCONTINUED MEDICATIONS:  Discharge Medication List as of 1/17/2021 10:56 PM                 (Please note that portions of this note were completed with a voice recognition program.  Efforts were made to edit the dictations but occasionally words are mis-transcribed.)    SAUL Mensah - CNP (electronically signed) Shay Boyle, APRN - CNP  01/17/21 2907

## 2021-01-18 NOTE — ED NOTES
Discharge instructions given, patient acknowledged understanding, patient ambulated out of ed upon discharge      Theresa Stephen RN  01/17/21 0940

## 2021-01-18 NOTE — CARE COORDINATION
Care Transition phone outreach s/p acute care visit 1.17.21  Left HIPAA compliant vm requesting return callback. Provided & repeated direct contact number to reach this nurse.

## 2021-01-18 NOTE — ED NOTES
Patient ambulated with portable pulse ox, sats remained above 90% and heart rate was 90, K Tristan informed      Daisy Seals RN  01/17/21 5819

## 2022-07-04 ENCOUNTER — HOSPITAL ENCOUNTER (EMERGENCY)
Age: 32
Discharge: HOME | End: 2022-07-04
Attending: EMERGENCY MEDICINE

## 2022-07-04 VITALS
SYSTOLIC BLOOD PRESSURE: 138 MMHG | HEIGHT: 75 IN | HEART RATE: 71 BPM | BODY MASS INDEX: 39.17 KG/M2 | WEIGHT: 315 LBS | TEMPERATURE: 99.1 F | OXYGEN SATURATION: 97 % | DIASTOLIC BLOOD PRESSURE: 87 MMHG | RESPIRATION RATE: 16 BRPM

## 2022-07-04 DIAGNOSIS — M54.42 ACUTE LEFT-SIDED LOW BACK PAIN WITH LEFT-SIDED SCIATICA: Primary | ICD-10-CM

## 2022-07-04 PROCEDURE — 6370000000 HC RX 637 (ALT 250 FOR IP): Performed by: EMERGENCY MEDICINE

## 2022-07-04 PROCEDURE — 99284 EMERGENCY DEPT VISIT MOD MDM: CPT

## 2022-07-04 PROCEDURE — 96372 THER/PROPH/DIAG INJ SC/IM: CPT

## 2022-07-04 PROCEDURE — 6360000002 HC RX W HCPCS: Performed by: EMERGENCY MEDICINE

## 2022-07-04 RX ORDER — NAPROXEN 500 MG/1
500 TABLET ORAL 2 TIMES DAILY PRN
Qty: 60 TABLET | Refills: 0 | Status: SHIPPED | OUTPATIENT
Start: 2022-07-04

## 2022-07-04 RX ORDER — KETOROLAC TROMETHAMINE 30 MG/ML
60 INJECTION, SOLUTION INTRAMUSCULAR; INTRAVENOUS ONCE
Status: COMPLETED | OUTPATIENT
Start: 2022-07-04 | End: 2022-07-04

## 2022-07-04 RX ORDER — ORPHENADRINE CITRATE 30 MG/ML
60 INJECTION INTRAMUSCULAR; INTRAVENOUS ONCE
Status: COMPLETED | OUTPATIENT
Start: 2022-07-04 | End: 2022-07-04

## 2022-07-04 RX ORDER — METHYLPREDNISOLONE 4 MG/1
TABLET ORAL
Qty: 1 KIT | Refills: 0 | Status: SHIPPED | OUTPATIENT
Start: 2022-07-04

## 2022-07-04 RX ORDER — CYCLOBENZAPRINE HCL 10 MG
10 TABLET ORAL 3 TIMES DAILY PRN
Qty: 20 TABLET | Refills: 0 | Status: SHIPPED | OUTPATIENT
Start: 2022-07-04 | End: 2022-07-14

## 2022-07-04 RX ORDER — LIDOCAINE 4 G/G
1 PATCH TOPICAL ONCE
Status: DISCONTINUED | OUTPATIENT
Start: 2022-07-04 | End: 2022-07-04 | Stop reason: HOSPADM

## 2022-07-04 RX ORDER — LIDOCAINE 50 MG/G
1 PATCH TOPICAL DAILY
Qty: 10 PATCH | Refills: 0 | Status: SHIPPED | OUTPATIENT
Start: 2022-07-04 | End: 2022-07-14

## 2022-07-04 RX ADMIN — KETOROLAC TROMETHAMINE 60 MG: 30 INJECTION, SOLUTION INTRAMUSCULAR at 07:38

## 2022-07-04 RX ADMIN — ORPHENADRINE CITRATE 60 MG: 30 INJECTION INTRAMUSCULAR; INTRAVENOUS at 07:38

## 2022-07-04 ASSESSMENT — PAIN SCALES - GENERAL
PAINLEVEL_OUTOF10: 10
PAINLEVEL_OUTOF10: 10

## 2022-07-04 ASSESSMENT — PAIN - FUNCTIONAL ASSESSMENT: PAIN_FUNCTIONAL_ASSESSMENT: 0-10

## 2022-07-04 NOTE — ED PROVIDER NOTES
[facility]) Emergency [address] [facility]    Harriett Canavan, MD, am the primary clinician of record. CHIEF COMPLAINT  Chief Complaint   Patient presents with    Back Pain     since last night while playing basketball     HISTORY OF PRESENT ILLNESS  Fern Aase is a 27 y.o. male who presents to the ED complaining of left-sided low back pain that sometimes radiates down the left leg, started acutely when he was jumping while playing basketball last night. The pain was severe at onset and has persisted throughout the night prompting him to come in today. That being said he did not have any direct trauma or contact/impact to the back itself. He denies any neurologic leg weakness. No bowel or bladder incontinence or urinary retention. No IV drug abuse history or cancer history or fevers recently. He has no chronic back pain problems. He is not anticoagulated. Denies any pain in the midline or right side of the back. Tylenol and biofreeze spray have not provided much relief. Pain worse with movement. No other complaints, modifying factors or associated symptoms. Nursing notes reviewed.    Past Medical History:   Diagnosis Date    Second degree atrioventricular block      Past Surgical History:   Procedure Laterality Date    HERNIA REPAIR       Family History   Problem Relation Age of Onset    Arrhythmia Brother         S/P PPM at age 13     Social History     Socioeconomic History    Marital status: Single     Spouse name: Not on file    Number of children: Not on file    Years of education: Not on file    Highest education level: Not on file   Occupational History    Not on file   Tobacco Use    Smoking status: Former Smoker     Packs/day: 1.00     Years: 12.00     Pack years: 12.00     Types: Cigars     Quit date: 11/2018     Years since quitting: 3.6    Smokeless tobacco: Never Used    Tobacco comment: two black and mild(cigars) a day   Vaping Use    Vaping Use: Never used   Substance and Sexual Activity    Alcohol use: Yes     Comment: socially    Drug use: Yes     Types: Marijuana Earnestine Miguelito)    Sexual activity: Not on file   Other Topics Concern    Not on file   Social History Narrative    Not on file     Social Determinants of Health     Financial Resource Strain:     Difficulty of Paying Living Expenses: Not on file   Food Insecurity:     Worried About Running Out of Food in the Last Year: Not on file    Usman of Food in the Last Year: Not on file   Transportation Needs:     Lack of Transportation (Medical): Not on file    Lack of Transportation (Non-Medical):  Not on file   Physical Activity:     Days of Exercise per Week: Not on file    Minutes of Exercise per Session: Not on file   Stress:     Feeling of Stress : Not on file   Social Connections:     Frequency of Communication with Friends and Family: Not on file    Frequency of Social Gatherings with Friends and Family: Not on file    Attends Rastafari Services: Not on file    Active Member of [address] [place] [address] or Organizations: Not on file    Attends Club or Organization Meetings: Not on file    Marital Status: Not on file   Intimate Partner Violence:     Fear of Current or Ex-Partner: Not on file    Emotionally Abused: Not on file    Physically Abused: Not on file    Sexually Abused: Not on file   Housing Stability:     Unable to Pay for Housing in the Last Year: Not on file    Number of [place] in the Last Year: Not on file    Unstable Housing in the Last Year: Not on file     Current Facility-Administered Medications   Medication Dose Route Frequency Provider Last Rate Last Admin    ketorolac (TORADOL) injection 60 mg  60 mg IntraMUSCular Once Radha Reid MD        orphenadrine (NORFLEX) injection 60 mg  60 mg IntraMUSCular Once Radha Reid MD        lidocaine 4 % external patch 1 patch  1 patch TransDERmal Once Radha Reid MD         Current Outpatient Medications Medication Sig Dispense Refill    cyclobenzaprine (FLEXERIL) 10 MG tablet Take 1 tablet by mouth 3 times daily as needed for Muscle spasms (CAUTION: Can cause dizziness, don't drive while taking.) 20 tablet 0    naproxen (NAPROSYN) 500 MG tablet Take 1 tablet by mouth 2 times daily as needed for Pain 60 tablet 0    methylPREDNISolone (MEDROL, LUIS,) 4 MG tablet Take by mouth. 1 kit 0    lidocaine (LIDODERM) 5 % Place 1 patch onto the skin daily for 10 days 12 hours on, 12 hours off. 10 patch 0    ibuprofen (ADVIL;MOTRIN) 200 MG tablet Take 200 mg by mouth every 6 hours as needed for Pain (Patient not taking: Reported on 7/4/2022)      ondansetron (ZOFRAN) 4 MG tablet Take 1 tablet by mouth every 8 hours as needed for Nausea (Patient not taking: Reported on 6/16/2020) 20 tablet 0    omeprazole (PRILOSEC) 20 MG delayed release capsule Take 1 capsule by mouth every morning (before breakfast) (Patient not taking: Reported on 6/16/2020) 30 capsule 0     Allergies   Allergen Reactions    Aspirin        REVIEW OF SYSTEMS  6 systems reviewed, pertinent positives per HPI otherwise noted to be negative    PHYSICAL EXAM   /87   Pulse 71   Temp 99.1 °F (37.3 °C)   Resp 16   Ht 6' 3\" (1.905 m)   Wt (!) 350 lb (158.8 kg)   SpO2 97%   BMI 43.75 kg/m²    GENERAL APPEARANCE: Awake and alert. Cooperative. No acute distress. HEAD: Normocephalic. Atraumatic. EYES: PERRL. EOM's grossly intact. ENT: Mucous membranes are moist.   NECK: Supple. Normal ROM. CHEST: Equal symmetric chest rise. LUNGS: Breathing is unlabored. Speaking comfortably in full sentences. ABDOMEN: Nondistended, nontender  SKIN: Warm and dry. No acute rashes. NEUROLOGICAL: Alert and oriented. Strength is 5/5 in all extremities and sensation is intact.   BACK:      Cervical: no tenderness noted, no midline tenderness, no paraspinous spasm      Thoracic: no tenderness noted, no midline tenderness, no paraspinous spasm      Lumbar: no midline tenderness, no R sided pain, neg SLR RLE. +L paraspinal ttp with spasming noted, +SLR LLE. MUSCULOSKELETAL:  RLE: No tenderness. 2+ DP and PT. Sensation and motor function fully intact. Full range of motion of all major joints. No erythema, bruising, or lacerations. Compartments are soft. 2+ patellar reflex. Achilles nontender and intact. Able to bear weight. No joint swelling or effusions are noted. LLE: No tenderness. 2+ DP and PT. Sensation and motor function fully intact. Full range of motion of all major joints. No erythema, bruising, or lacerations. Compartments are soft. 2+ patellar reflex. Achilles nontender and intact. Able to bear weight. No joint swelling or effusions are noted. ED COURSE/MDM  Differential diagnosis considerations included: abdominal aortic aneurysm, cauda equina syndrome, epidural mass lesion, spinal stenosis, herniated disk causing severe stenosis, sprain/strain, fracture, contusion, sciatica, UTI, pyelonephritis, kidney stone    The patient's ED course was notable for nontraumatic back pain. No indication for CT x-ray or emergent MRI. This is not an impact related injury but rather from jumping while playing basketball, causing either a muscle strain/partial paraspinous tear or herniation of disc suspicious for sciatica clinically on exam.  No red flags for cauda equina syndrome or spinal cord compression on exam or by history. Will be treated symptomatically with steroids NSAIDs topical lidocaine patches and muscle relaxers and referred to primary care for follow-up and reassessment. Patient was given scripts for the following medications. I counseled patient how to take these medications.    New Prescriptions    CYCLOBENZAPRINE (FLEXERIL) 10 MG TABLET    Take 1 tablet by mouth 3 times daily as needed for Muscle spasms (CAUTION: Can cause dizziness, don't drive while taking.)    LIDOCAINE (LIDODERM) 5 %    Place 1 patch onto the skin daily for 10

## 2023-05-09 ENCOUNTER — APPOINTMENT (OUTPATIENT)
Dept: GENERAL RADIOLOGY | Age: 33
End: 2023-05-09

## 2023-05-09 ENCOUNTER — TELEPHONE (OUTPATIENT)
Dept: ORTHOPEDIC SURGERY | Age: 33
End: 2023-05-09

## 2023-05-09 ENCOUNTER — HOSPITAL ENCOUNTER (EMERGENCY)
Age: 33
Discharge: HOME | End: 2023-05-09
Attending: INTERNAL MEDICINE

## 2023-05-09 VITALS
TEMPERATURE: 98.1 F | BODY MASS INDEX: 39.17 KG/M2 | WEIGHT: 315 LBS | DIASTOLIC BLOOD PRESSURE: 82 MMHG | OXYGEN SATURATION: 96 % | HEART RATE: 62 BPM | RESPIRATION RATE: 18 BRPM | HEIGHT: 75 IN | SYSTOLIC BLOOD PRESSURE: 122 MMHG

## 2023-05-09 DIAGNOSIS — S83.92XA SPRAIN OF LEFT KNEE, UNSPECIFIED LIGAMENT, INITIAL ENCOUNTER: Primary | ICD-10-CM

## 2023-05-09 PROCEDURE — 73562 X-RAY EXAM OF KNEE 3: CPT

## 2023-05-09 PROCEDURE — 99283 EMERGENCY DEPT VISIT LOW MDM: CPT

## 2023-05-09 PROCEDURE — 6370000000 HC RX 637 (ALT 250 FOR IP): Performed by: INTERNAL MEDICINE

## 2023-05-09 RX ORDER — NAPROXEN 500 MG/1
500 TABLET ORAL 2 TIMES DAILY WITH MEALS
Qty: 60 TABLET | Refills: 0 | Status: SHIPPED | OUTPATIENT
Start: 2023-05-09

## 2023-05-09 RX ORDER — METHOCARBAMOL 500 MG/1
500 TABLET, FILM COATED ORAL ONCE
Status: DISCONTINUED | OUTPATIENT
Start: 2023-05-09 | End: 2023-05-09 | Stop reason: HOSPADM

## 2023-05-09 RX ORDER — NAPROXEN 250 MG/1
500 TABLET ORAL ONCE
Status: COMPLETED | OUTPATIENT
Start: 2023-05-09 | End: 2023-05-09

## 2023-05-09 RX ORDER — METHOCARBAMOL 500 MG/1
500 TABLET, FILM COATED ORAL 4 TIMES DAILY
Qty: 20 TABLET | Refills: 0 | Status: SHIPPED | OUTPATIENT
Start: 2023-05-09 | End: 2023-05-14

## 2023-05-09 RX ADMIN — NAPROXEN 500 MG: 250 TABLET ORAL at 07:17

## 2023-05-09 ASSESSMENT — PAIN SCALES - GENERAL
PAINLEVEL_OUTOF10: 8
PAINLEVEL_OUTOF10: 8

## 2023-05-09 ASSESSMENT — PAIN - FUNCTIONAL ASSESSMENT: PAIN_FUNCTIONAL_ASSESSMENT: 0-10

## 2023-05-09 ASSESSMENT — LIFESTYLE VARIABLES
HOW OFTEN DO YOU HAVE A DRINK CONTAINING ALCOHOL: MONTHLY OR LESS
HOW MANY STANDARD DRINKS CONTAINING ALCOHOL DO YOU HAVE ON A TYPICAL DAY: 1 OR 2

## 2023-05-09 NOTE — ED NOTES
Discharge instructions provided to pt. Pt became belligerent stating \"I've been here for over three hours and you've wasted my God damn time! \" Education provided on follow up appt with ortho. Writer apologized for the perception of \"wasting his time. \" Noted pt to be walking out the ER door cussing and stopped to take knee immobilizer off while standing in the door directly outside.       Bayron Magaña RN  05/09/23 [id number]

## 2023-05-09 NOTE — TELEPHONE ENCOUNTER
Did leave message regarding ED ref for a f/u appointment. Upon return call please schedule with Dr Michael Davies.

## 2023-05-09 NOTE — ED PROVIDER NOTES
EMERGENCY MEDICINE PROVIDER NOTE    Patient Identification  Pt Name: Smooth Ryan  MRN: [id number]  Abimaelgfjustin 1990  Date of evaluation: 5/9/2023  Provider: Edilson Rosales DO  PCP: No primary care provider on file. Chief Complaint  Leg Injury (Pt arrives to ED with c/o leg pain r/t to an injury. 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 Pt states he couldn't feel he leg for a couple minutes when it first happened. Pt states he has had constant pain since the injury occurred, but we awoke yesterday to the injury being aggravated and much worse. Pt states he hasn't been able to fully extend his leg since the injury occurred, and that he was unable to pressure on it this morning when he got up.)      HPI  (History provided by patient)  This is a 26 y.o. male who was brought in by self for left knee and left leg pain. Patient states he was working last week and walking when he stepped on uneven ground with his left leg. He felt numbness in his left leg. The pain shot up his left leg from his knee. The pain is sharp in nature. Patient denies any back pain right now but occasionally he does have back pain. The pain is located mostly over the lateral aspect of the left knee. He denies any hip pain or ankle pain. He denies any fall. Patient denies loss of bowel or bladder function. I have reviewed the following nursing documentation:  Allergies: Aspirin    Past medical history:   Past Medical History:   Diagnosis Date    Second degree atrioventricular block      Past surgical history:   Past Surgical History:   Procedure Laterality Date    HERNIA REPAIR         Home medications:   Previous Medications    IBUPROFEN (ADVIL;MOTRIN) 200 MG TABLET    Take 200 mg by mouth every 6 hours as needed for Pain    METHYLPREDNISOLONE (MEDROL, LUIS,) 4 MG TABLET    Take by mouth.     NAPROXEN (NAPROSYN) 500 MG TABLET    Take 1 tablet by mouth 2 times daily as needed for Pain

## 2023-05-10 NOTE — TELEPHONE ENCOUNTER
2nd attempt: Did leave message regarding ED ref for a f/u appointment. Upon return call please schedule with Dr Guerline Mars.

## 2025-08-06 ENCOUNTER — TELEPHONE (OUTPATIENT)
Dept: CARDIOLOGY CLINIC | Age: 35
End: 2025-08-06